# Patient Record
Sex: FEMALE | Race: WHITE | Employment: UNEMPLOYED | URBAN - METROPOLITAN AREA
[De-identification: names, ages, dates, MRNs, and addresses within clinical notes are randomized per-mention and may not be internally consistent; named-entity substitution may affect disease eponyms.]

---

## 2022-03-23 ENCOUNTER — HOSPITAL ENCOUNTER (OUTPATIENT)
Age: 27
Setting detail: OBSERVATION
Discharge: HOME | End: 2022-03-24
Attending: OBSTETRICS & GYNECOLOGY | Admitting: OBSTETRICS & GYNECOLOGY
Payer: COMMERCIAL

## 2022-03-23 PROBLEM — O26.899 ABDOMINAL PAIN AFFECTING PREGNANCY: Status: ACTIVE | Noted: 2022-03-23

## 2022-03-23 PROBLEM — R10.9 ABDOMINAL PAIN AFFECTING PREGNANCY: Status: ACTIVE | Noted: 2022-03-23

## 2022-03-23 PROCEDURE — 85025 COMPLETE CBC W/AUTO DIFF WBC: CPT

## 2022-03-23 PROCEDURE — 81001 URINALYSIS AUTO W/SCOPE: CPT

## 2022-03-23 PROCEDURE — 80053 COMPREHEN METABOLIC PANEL: CPT

## 2022-03-23 PROCEDURE — 84156 ASSAY OF PROTEIN URINE: CPT

## 2022-03-23 PROCEDURE — 36415 COLL VENOUS BLD VENIPUNCTURE: CPT

## 2022-03-23 PROCEDURE — 75810000275 HC EMERGENCY DEPT VISIT NO LEVEL OF CARE

## 2022-03-23 PROCEDURE — 74011250636 HC RX REV CODE- 250/636: Performed by: OBSTETRICS & GYNECOLOGY

## 2022-03-23 RX ORDER — ACETAMINOPHEN 325 MG/1
650 TABLET ORAL
COMMUNITY

## 2022-03-23 RX ORDER — ASPIRIN 81 MG/1
81 TABLET ORAL DAILY
COMMUNITY

## 2022-03-23 RX ORDER — CHOLECALCIFEROL (VITAMIN D3) 50 MCG
CAPSULE ORAL
COMMUNITY

## 2022-03-23 RX ADMIN — SODIUM CHLORIDE, POTASSIUM CHLORIDE, SODIUM LACTATE AND CALCIUM CHLORIDE 1000 ML: 600; 310; 30; 20 INJECTION, SOLUTION INTRAVENOUS at 23:30

## 2022-03-24 VITALS
BODY MASS INDEX: 39.78 KG/M2 | HEIGHT: 64 IN | RESPIRATION RATE: 18 BRPM | HEART RATE: 87 BPM | WEIGHT: 233 LBS | OXYGEN SATURATION: 97 % | DIASTOLIC BLOOD PRESSURE: 70 MMHG | TEMPERATURE: 98.3 F | SYSTOLIC BLOOD PRESSURE: 123 MMHG

## 2022-03-24 LAB
ALBUMIN SERPL-MCNC: 2.7 G/DL (ref 3.5–5)
ALBUMIN/GLOB SERPL: 0.8 {RATIO} (ref 1.1–2.2)
ALP SERPL-CCNC: 103 U/L (ref 45–117)
ALT SERPL-CCNC: 13 U/L (ref 12–78)
ANION GAP SERPL CALC-SCNC: 7 MMOL/L (ref 5–15)
APPEARANCE UR: CLEAR
AST SERPL W P-5'-P-CCNC: 17 U/L (ref 15–37)
ATRIAL RATE: 86 BPM
BACTERIA URNS QL MICRO: NEGATIVE /HPF
BASOPHILS # BLD: 0 K/UL (ref 0–0.1)
BASOPHILS NFR BLD: 0 % (ref 0–1)
BILIRUB SERPL-MCNC: 0.2 MG/DL (ref 0.2–1)
BILIRUB UR QL: NEGATIVE
BUN SERPL-MCNC: 8 MG/DL (ref 6–20)
BUN/CREAT SERPL: 14 (ref 12–20)
CA-I BLD-MCNC: 9.2 MG/DL (ref 8.5–10.1)
CALCULATED P AXIS, ECG09: 55 DEGREES
CALCULATED R AXIS, ECG10: 27 DEGREES
CALCULATED T AXIS, ECG11: 27 DEGREES
CAOX CRY URNS QL MICRO: ABNORMAL
CHLORIDE SERPL-SCNC: 108 MMOL/L (ref 97–108)
CO2 SERPL-SCNC: 22 MMOL/L (ref 21–32)
COLOR UR: YELLOW
CREAT SERPL-MCNC: 0.57 MG/DL (ref 0.55–1.02)
CREAT UR-MCNC: 121 MG/DL
DIAGNOSIS, 93000: NORMAL
DIFFERENTIAL METHOD BLD: ABNORMAL
EOSINOPHIL # BLD: 0 K/UL (ref 0–0.4)
EOSINOPHIL NFR BLD: 0 % (ref 0–7)
ERYTHROCYTE [DISTWIDTH] IN BLOOD BY AUTOMATED COUNT: 12.9 % (ref 11.5–14.5)
GLOBULIN SER CALC-MCNC: 3.3 G/DL (ref 2–4)
GLUCOSE SERPL-MCNC: 123 MG/DL (ref 65–100)
GLUCOSE UR STRIP.AUTO-MCNC: >1000 MG/DL
HCT VFR BLD AUTO: 32.9 % (ref 35–47)
HGB BLD-MCNC: 10.7 G/DL (ref 11.5–16)
HGB UR QL STRIP: NEGATIVE
IMM GRANULOCYTES # BLD AUTO: 0.1 K/UL (ref 0–0.04)
IMM GRANULOCYTES NFR BLD AUTO: 1 % (ref 0–0.5)
KETONES UR QL STRIP.AUTO: NEGATIVE MG/DL
LEUKOCYTE ESTERASE UR QL STRIP.AUTO: NEGATIVE
LYMPHOCYTES # BLD: 1.8 K/UL (ref 0.8–3.5)
LYMPHOCYTES NFR BLD: 23 % (ref 12–49)
MCH RBC QN AUTO: 27.4 PG (ref 26–34)
MCHC RBC AUTO-ENTMCNC: 32.5 G/DL (ref 30–36.5)
MCV RBC AUTO: 84.1 FL (ref 80–99)
MONOCYTES # BLD: 0.6 K/UL (ref 0–1)
MONOCYTES NFR BLD: 7 % (ref 5–13)
MUCOUS THREADS URNS QL MICRO: ABNORMAL /LPF
NEUTS SEG # BLD: 5.5 K/UL (ref 1.8–8)
NEUTS SEG NFR BLD: 69 % (ref 32–75)
NITRITE UR QL STRIP.AUTO: NEGATIVE
NRBC # BLD: 0 K/UL (ref 0–0.01)
NRBC BLD-RTO: 0 PER 100 WBC
P-R INTERVAL, ECG05: 138 MS
PH UR STRIP: 6 [PH] (ref 5–8)
PLATELET # BLD AUTO: 201 K/UL (ref 150–400)
PMV BLD AUTO: 10.7 FL (ref 8.9–12.9)
POTASSIUM SERPL-SCNC: 4.1 MMOL/L (ref 3.5–5.1)
PROT SERPL-MCNC: 6 G/DL (ref 6.4–8.2)
PROT UR STRIP-MCNC: NEGATIVE MG/DL
PROT UR-MCNC: 44 MG/DL (ref 0–11.9)
PROT/CREAT UR-RTO: 0.4
Q-T INTERVAL, ECG07: 362 MS
QRS DURATION, ECG06: 86 MS
QTC CALCULATION (BEZET), ECG08: 433 MS
RBC # BLD AUTO: 3.91 M/UL (ref 3.8–5.2)
RBC #/AREA URNS HPF: ABNORMAL /HPF (ref 0–5)
SODIUM SERPL-SCNC: 137 MMOL/L (ref 136–145)
SP GR UR REFRACTOMETRY: 1.02 (ref 1–1.03)
UROBILINOGEN UR QL STRIP.AUTO: 0.1 EU/DL (ref 0.1–1)
VENTRICULAR RATE, ECG03: 86 BPM
WBC # BLD AUTO: 8.1 K/UL (ref 3.6–11)
WBC URNS QL MICRO: ABNORMAL /HPF (ref 0–4)

## 2022-03-24 PROCEDURE — 93010 ELECTROCARDIOGRAM REPORT: CPT | Performed by: INTERNAL MEDICINE

## 2022-03-24 PROCEDURE — 99285 EMERGENCY DEPT VISIT HI MDM: CPT

## 2022-03-24 PROCEDURE — 93005 ELECTROCARDIOGRAM TRACING: CPT

## 2022-03-24 PROCEDURE — 96365 THER/PROPH/DIAG IV INF INIT: CPT

## 2022-03-24 PROCEDURE — 96366 THER/PROPH/DIAG IV INF ADDON: CPT

## 2022-03-24 PROCEDURE — G0378 HOSPITAL OBSERVATION PER HR: HCPCS

## 2022-03-24 PROCEDURE — 99218 PR INITIAL OBSERVATION CARE/DAY 30 MINUTES: CPT | Performed by: OBSTETRICS & GYNECOLOGY

## 2022-03-24 PROCEDURE — 74011250636 HC RX REV CODE- 250/636: Performed by: OBSTETRICS & GYNECOLOGY

## 2022-03-24 RX ADMIN — SODIUM CHLORIDE, POTASSIUM CHLORIDE, SODIUM LACTATE AND CALCIUM CHLORIDE 1000 ML: 600; 310; 30; 20 INJECTION, SOLUTION INTRAVENOUS at 00:30

## 2022-03-24 NOTE — DISCHARGE INSTRUCTIONS
Increase oral water intake, continue to monitor blood sugars, reduce sodium intake and limit carb intake, and elevate extremities as often as possible to reduce discomfort from swelling. Perform kick counts if noticing any decreased fetal movement. Follow up with Primary OBGYN, be sure to bring visit summary with you to office visit.

## 2022-03-24 NOTE — PROGRESS NOTES
2142-Pt arrived via wheelchair from ER. [facility] and provide urine sample, then attached to monitors. Pt complains of abdominal pain and pelvic pressure. Denies any vaginal bleeding or leaking of fluids, recent intercourse and endorses fetal movement. Pt is currently seeing Dr. Anabell Prince [facility] in [place], [place], history of Gestational Hypertension and Diabetes. Currently is complaining of tingling in bilateral hands and right pectoral/axillary pain that are both occurring during contractions. States that she is having floaters in her vision, but denies headache, epigastric pain,and her respiratory efforts are regular and has noted clear lung sounds. Noted generalized edema, and +2 pitting edema on bilateral lower extremities up to mid calf region. 2158-SVE performed and noted to be 0.5 and high in position. 2200-DrGuilherme Gang at bedside, POC reviewed with pt. Opportunity for questions and concerns. 2320-IV inserted on 3rd attempt by myself and another RN. Unable to retrieve blood at the time of insertion, lab contacted for STAT orders. 2330-IV fluids bolus initiated. 0045-Rosana Gang updated on strip, blood pressures, and lab results, verbal orders to perform SVE again and if no changes then she is clear for discharge. 0120-Pt called nurse to room to inform me that she was experiencing chest heaviness located across chest, reassessed heart and lung sounds and no changes were noted. Reuced bolus rate to 555mL/hr, 5 minutes after, pt states that her chest heaviness improved. 0130-Rosana Gang contacted and informed of chest heaviness, verbal ordder rcvd to have STAT EKG performed, oif no abdnormal reading noted, she may be discgarged. 0145-EKG being performed at bedside. Noted to be Normal Sinus Rhythm. 0213-Repeat SVE performed and no change was noted.      0215-Discharge instructions:Educated pt in great length to increase oral water intake, continue to monitor blood sugars, reduce sodium intake and limit carb intake, elevate extremities as often as possible to reduce discomfort from swelling, and performing kick counts. Pt instructed to follow up with primary OBGYN and be sure to bring visit summary from visit today. Return to ER if any vaginal bleeding or loss of fluids is noted, persisting abdominal pain or contraction pattern that is consistent and has a regular pattern. Copy of EKG was given to pt. Opportunity given for questions or concerns.

## 2022-03-24 NOTE — PROGRESS NOTES
26- Writer spoke with Dr. Nancie Perez over th phone to update him on patient. New orders received (2L LR bolus, CBC, CMP, Urine protein creatinine ratio).

## 2022-03-24 NOTE — ED NOTES
Spoke with eron ll&d charge. Pt is a  no previous   33 weeks from fl. States lower abd and vaginal pressure with back pain progressing through the day.  Pt to go to l&d  6

## 2022-03-25 NOTE — H&P
History and Physical    OB PROGRESS NOTE /OUTPATIENT NOTE    DATE OF SERVICE: 24 MAR 2022    CHIEF COMPLAINT: Suspected labor    PROBLEM: Pregnancy at 33-1/7-week gestation    SUBJECTIVE: Patient is doing better after bedrest and IV hydration    OBJECTIVE:    VITALS:  Visit Vitals  /70   Pulse 87   Temp 98.3 °F (36.8 °C)   Resp 18   Ht 5' 4\" (1.626 m)   Wt 233 lb (105.7 kg)   SpO2 97%   BMI 39.99 kg/m²      HEART: Regular rhythm, no murmur  LUNGS: Clear to auscultation at both fields  ABDOMEN: Gravid, fetal heart tones present  PELVIC: Cervix: Closed, effacement: Poor    LABS:  I have reviewed all the lab results. NST: Reactive    ASSESSMENT:  Pregnancy at 33-1/7-week gestation  False labor  Reactive nonstress test    PLAN: Discharge to home. Instructed to monitor fetal movements. Labor precautions discussed. Appointment to clinic.